# Patient Record
Sex: MALE | Race: WHITE
[De-identification: names, ages, dates, MRNs, and addresses within clinical notes are randomized per-mention and may not be internally consistent; named-entity substitution may affect disease eponyms.]

---

## 2018-11-23 ENCOUNTER — HOSPITAL ENCOUNTER (EMERGENCY)
Dept: HOSPITAL 94 - ER | Age: 48
Discharge: HOME | End: 2018-11-23
Payer: MEDICAID

## 2018-11-23 VITALS — HEIGHT: 67 IN | WEIGHT: 154.32 LBS | BODY MASS INDEX: 24.22 KG/M2

## 2018-11-23 VITALS — SYSTOLIC BLOOD PRESSURE: 131 MMHG | DIASTOLIC BLOOD PRESSURE: 78 MMHG

## 2018-11-23 DIAGNOSIS — I10: ICD-10-CM

## 2018-11-23 DIAGNOSIS — Z79.899: ICD-10-CM

## 2018-11-23 DIAGNOSIS — J45.909: ICD-10-CM

## 2018-11-23 DIAGNOSIS — G89.29: ICD-10-CM

## 2018-11-23 DIAGNOSIS — Z56.0: ICD-10-CM

## 2018-11-23 DIAGNOSIS — M25.511: Primary | ICD-10-CM

## 2018-11-23 PROCEDURE — 99283 EMERGENCY DEPT VISIT LOW MDM: CPT

## 2018-11-23 PROCEDURE — 96372 THER/PROPH/DIAG INJ SC/IM: CPT

## 2018-11-23 PROCEDURE — 73030 X-RAY EXAM OF SHOULDER: CPT

## 2018-11-23 SDOH — ECONOMIC STABILITY - INCOME SECURITY: UNEMPLOYMENT, UNSPECIFIED: Z56.0

## 2020-10-28 ENCOUNTER — HOSPITAL ENCOUNTER (INPATIENT)
Dept: HOSPITAL 94 - ER | Age: 50
LOS: 3 days | Discharge: HOME | DRG: 243 | End: 2020-10-31
Attending: FAMILY MEDICINE | Admitting: FAMILY MEDICINE
Payer: MEDICAID

## 2020-10-28 VITALS — BODY MASS INDEX: 28.99 KG/M2 | HEIGHT: 66 IN | WEIGHT: 180.38 LBS

## 2020-10-28 DIAGNOSIS — I73.9: ICD-10-CM

## 2020-10-28 DIAGNOSIS — J44.9: ICD-10-CM

## 2020-10-28 DIAGNOSIS — K44.9: ICD-10-CM

## 2020-10-28 DIAGNOSIS — F17.200: ICD-10-CM

## 2020-10-28 DIAGNOSIS — E87.6: ICD-10-CM

## 2020-10-28 DIAGNOSIS — K21.01: Primary | ICD-10-CM

## 2020-10-28 DIAGNOSIS — F32.9: ICD-10-CM

## 2020-10-28 DIAGNOSIS — K52.9: ICD-10-CM

## 2020-10-28 DIAGNOSIS — F41.9: ICD-10-CM

## 2020-10-28 DIAGNOSIS — G89.29: ICD-10-CM

## 2020-10-28 DIAGNOSIS — D50.9: ICD-10-CM

## 2020-10-28 DIAGNOSIS — Z87.11: ICD-10-CM

## 2020-10-28 DIAGNOSIS — E86.0: ICD-10-CM

## 2020-10-28 DIAGNOSIS — I10: ICD-10-CM

## 2020-10-28 DIAGNOSIS — F12.10: ICD-10-CM

## 2020-10-28 DIAGNOSIS — F20.9: ICD-10-CM

## 2020-10-28 LAB
ALBUMIN SERPL BCP-MCNC: 4.3 G/DL (ref 3.4–5)
ALBUMIN/GLOB SERPL: 1 {RATIO} (ref 1.1–1.5)
ALP SERPL-CCNC: 104 IU/L (ref 46–116)
ALT SERPL W P-5'-P-CCNC: 29 U/L (ref 12–78)
ANION GAP SERPL CALCULATED.3IONS-SCNC: 20 MMOL/L (ref 8–16)
AST SERPL W P-5'-P-CCNC: 40 U/L (ref 10–37)
BASOPHILS # BLD AUTO: 0.1 X10'3 (ref 0–0.2)
BASOPHILS NFR BLD AUTO: 0.3 % (ref 0–1)
BILIRUB SERPL-MCNC: 0.4 MG/DL (ref 0.1–1)
BUN SERPL-MCNC: 18 MG/DL (ref 7–18)
BUN/CREAT SERPL: 14.3 (ref 5.4–32)
CALCIUM SERPL-MCNC: 9 MG/DL (ref 8.5–10.1)
CHLORIDE SERPL-SCNC: 99 MMOL/L (ref 99–107)
CO2 SERPL-SCNC: 23 MMOL/L (ref 24–32)
CREAT SERPL-MCNC: 1.26 MG/DL (ref 0.6–1.1)
EOSINOPHIL # BLD AUTO: 0 X10'3 (ref 0–0.9)
EOSINOPHIL NFR BLD AUTO: 0 % (ref 0–6)
ERYTHROCYTE [DISTWIDTH] IN BLOOD BY AUTOMATED COUNT: 18.3 % (ref 11.5–14.5)
GASTROCULT GAST QL: POSITIVE
GFR SERPL CREATININE-BSD FRML MDRD: 60 ML/MIN
GLUCOSE SERPL-MCNC: 137 MG/DL (ref 70–104)
HBA1C MFR BLD: 6.2 % (ref 4.5–6.2)
HCT VFR BLD AUTO: 38.8 % (ref 42–52)
HGB BLD-MCNC: 12.4 G/DL (ref 14–17.9)
LIPASE SERPL-CCNC: < 50 U/L (ref 73–393)
LYMPHOCYTES # BLD AUTO: 1.6 X10'3 (ref 1.1–4.8)
LYMPHOCYTES NFR BLD AUTO: 9.2 % (ref 21–51)
MCH RBC QN AUTO: 25.1 PG (ref 27–31)
MCHC RBC AUTO-ENTMCNC: 32.1 G/DL (ref 33–36.5)
MCV RBC AUTO: 78.2 FL (ref 78–98)
MONOCYTES # BLD AUTO: 1 X10'3 (ref 0–0.9)
MONOCYTES NFR BLD AUTO: 5.6 % (ref 2–12)
NEUTROPHILS # BLD AUTO: 14.5 X10'3 (ref 1.8–7.7)
NEUTROPHILS NFR BLD AUTO: 84.9 % (ref 42–75)
PLATELET # BLD AUTO: 281 X10'3 (ref 140–440)
PMV BLD AUTO: 9.5 FL (ref 7.4–10.4)
POTASSIUM SERPL-SCNC: 3.3 MMOL/L (ref 3.5–5.1)
PROT SERPL-MCNC: 8.5 G/DL (ref 6.4–8.2)
RBC # BLD AUTO: 4.96 X10'6 (ref 4.7–6.1)
SODIUM SERPL-SCNC: 142 MMOL/L (ref 135–145)
WBC # BLD AUTO: 17.1 X10'3 (ref 4.5–11)

## 2020-10-28 PROCEDURE — 96375 TX/PRO/DX INJ NEW DRUG ADDON: CPT

## 2020-10-28 PROCEDURE — 74022 RADEX COMPL AQT ABD SERIES: CPT

## 2020-10-28 PROCEDURE — 87040 BLOOD CULTURE FOR BACTERIA: CPT

## 2020-10-28 PROCEDURE — 83735 ASSAY OF MAGNESIUM: CPT

## 2020-10-28 PROCEDURE — 94640 AIRWAY INHALATION TREATMENT: CPT

## 2020-10-28 PROCEDURE — 86901 BLOOD TYPING SEROLOGIC RH(D): CPT

## 2020-10-28 PROCEDURE — 96374 THER/PROPH/DIAG INJ IV PUSH: CPT

## 2020-10-28 PROCEDURE — 94760 N-INVAS EAR/PLS OXIMETRY 1: CPT

## 2020-10-28 PROCEDURE — 85025 COMPLETE CBC W/AUTO DIFF WBC: CPT

## 2020-10-28 PROCEDURE — 99285 EMERGENCY DEPT VISIT HI MDM: CPT

## 2020-10-28 PROCEDURE — 99153 MOD SED SAME PHYS/QHP EA: CPT

## 2020-10-28 PROCEDURE — BW211ZZ COMPUTERIZED TOMOGRAPHY (CT SCAN) OF ABDOMEN AND PELVIS USING LOW OSMOLAR CONTRAST: ICD-10-PCS

## 2020-10-28 PROCEDURE — 80053 COMPREHEN METABOLIC PANEL: CPT

## 2020-10-28 PROCEDURE — 80061 LIPID PANEL: CPT

## 2020-10-28 PROCEDURE — 84443 ASSAY THYROID STIM HORMONE: CPT

## 2020-10-28 PROCEDURE — 80305 DRUG TEST PRSMV DIR OPT OBS: CPT

## 2020-10-28 PROCEDURE — 74177 CT ABD & PELVIS W/CONTRAST: CPT

## 2020-10-28 PROCEDURE — 83605 ASSAY OF LACTIC ACID: CPT

## 2020-10-28 PROCEDURE — 81001 URINALYSIS AUTO W/SCOPE: CPT

## 2020-10-28 PROCEDURE — 84100 ASSAY OF PHOSPHORUS: CPT

## 2020-10-28 PROCEDURE — 74183 MRI ABD W/O CNTR FLWD CNTR: CPT

## 2020-10-28 PROCEDURE — 83540 ASSAY OF IRON: CPT

## 2020-10-28 PROCEDURE — 87081 CULTURE SCREEN ONLY: CPT

## 2020-10-28 PROCEDURE — 83036 HEMOGLOBIN GLYCOSYLATED A1C: CPT

## 2020-10-28 PROCEDURE — 82271 OCCULT BLOOD OTHER SOURCES: CPT

## 2020-10-28 PROCEDURE — 83550 IRON BINDING TEST: CPT

## 2020-10-28 PROCEDURE — 86900 BLOOD TYPING SEROLOGIC ABO: CPT

## 2020-10-28 PROCEDURE — 83690 ASSAY OF LIPASE: CPT

## 2020-10-28 PROCEDURE — 82728 ASSAY OF FERRITIN: CPT

## 2020-10-28 PROCEDURE — 36415 COLL VENOUS BLD VENIPUNCTURE: CPT

## 2020-10-28 PROCEDURE — 99152 MOD SED SAME PHYS/QHP 5/>YRS: CPT

## 2020-10-28 PROCEDURE — 43239 EGD BIOPSY SINGLE/MULTIPLE: CPT

## 2020-10-28 PROCEDURE — 86885 COOMBS TEST INDIRECT QUAL: CPT

## 2020-10-28 PROCEDURE — 74178 CT ABD&PLV WO CNTR FLWD CNTR: CPT

## 2020-10-28 PROCEDURE — 84145 PROCALCITONIN (PCT): CPT

## 2020-10-28 RX ADMIN — PANTOPRAZOLE SODIUM SCH MLS/HR: 40 INJECTION, POWDER, FOR SOLUTION INTRAVENOUS at 15:20

## 2020-10-28 RX ADMIN — BUDESONIDE SCH MG: 0.5 INHALANT RESPIRATORY (INHALATION) at 21:18

## 2020-10-28 RX ADMIN — PANTOPRAZOLE SODIUM SCH MLS/HR: 40 INJECTION, POWDER, FOR SOLUTION INTRAVENOUS at 20:39

## 2020-10-28 RX ADMIN — PANTOPRAZOLE SODIUM SCH MLS/HR: 40 INJECTION, POWDER, FOR SOLUTION INTRAVENOUS at 16:19

## 2020-10-28 NOTE — NUR
PT TAKES SEROQUEL 400 MG 2 TABS HS. WE DO NOT HAVE THIS IN OUR FORMULARY AND 
THE PT IS UNABLE TO BRING IT HERE. PHARMACIST, HUMA, CONSULTED AND RECOMMENDS 
SEROQUEL  MG BID. DR. MILLIGAN CALLED AND VERBAL RECEIVED FOR THIS NEW 
ORDER. PT PL[facility] BED. GIVEN MSIV 2 MG FOR ABD PAIN OF 7 OUT OF 10. 
PT WITH NO NAUSEA.

## 2020-10-28 NOTE — NUR
SPOKE WITH PT'S WIFE AND UPDATED HER ON HIS CONDITION. 



PT C/O STILL FEELING NAUSEOUS, DR. ROBERTSON WAS MADE AWARE.

## 2020-10-29 VITALS — SYSTOLIC BLOOD PRESSURE: 144 MMHG | DIASTOLIC BLOOD PRESSURE: 89 MMHG

## 2020-10-29 VITALS — SYSTOLIC BLOOD PRESSURE: 141 MMHG | DIASTOLIC BLOOD PRESSURE: 87 MMHG

## 2020-10-29 VITALS — SYSTOLIC BLOOD PRESSURE: 150 MMHG | DIASTOLIC BLOOD PRESSURE: 98 MMHG

## 2020-10-29 VITALS — SYSTOLIC BLOOD PRESSURE: 131 MMHG | DIASTOLIC BLOOD PRESSURE: 76 MMHG

## 2020-10-29 VITALS — SYSTOLIC BLOOD PRESSURE: 142 MMHG | DIASTOLIC BLOOD PRESSURE: 86 MMHG

## 2020-10-29 VITALS — DIASTOLIC BLOOD PRESSURE: 88 MMHG | SYSTOLIC BLOOD PRESSURE: 145 MMHG

## 2020-10-29 VITALS — DIASTOLIC BLOOD PRESSURE: 88 MMHG | SYSTOLIC BLOOD PRESSURE: 144 MMHG

## 2020-10-29 VITALS — SYSTOLIC BLOOD PRESSURE: 142 MMHG | DIASTOLIC BLOOD PRESSURE: 89 MMHG

## 2020-10-29 VITALS — SYSTOLIC BLOOD PRESSURE: 138 MMHG | DIASTOLIC BLOOD PRESSURE: 98 MMHG

## 2020-10-29 VITALS — SYSTOLIC BLOOD PRESSURE: 134 MMHG | DIASTOLIC BLOOD PRESSURE: 94 MMHG

## 2020-10-29 LAB
ALBUMIN SERPL BCP-MCNC: 3.2 G/DL (ref 3.4–5)
ALBUMIN/GLOB SERPL: 0.9 {RATIO} (ref 1.1–1.5)
ALP SERPL-CCNC: 76 IU/L (ref 46–116)
ALT SERPL W P-5'-P-CCNC: 22 U/L (ref 12–78)
AMPHETAMINES UR QL SCN: NEGATIVE
ANION GAP SERPL CALCULATED.3IONS-SCNC: 6 MMOL/L (ref 8–16)
AST SERPL W P-5'-P-CCNC: 25 U/L (ref 10–37)
BACTERIA URNS QL MICRO: (no result) /HPF
BARBITURATES UR QL SCN: NEGATIVE
BASOPHILS # BLD AUTO: 0 X10'3 (ref 0–0.2)
BASOPHILS NFR BLD AUTO: 0.3 % (ref 0–1)
BENZODIAZ UR QL SCN: POSITIVE
BILIRUB SERPL-MCNC: 0.4 MG/DL (ref 0.1–1)
BUN SERPL-MCNC: 11 MG/DL (ref 7–18)
BUN/CREAT SERPL: 13.9 (ref 5.4–32)
BZE UR QL SCN: NEGATIVE
CALCIUM SERPL-MCNC: 8.1 MG/DL (ref 8.5–10.1)
CANNABINOIDS UR QL SCN: POSITIVE
CHLORIDE SERPL-SCNC: 105 MMOL/L (ref 99–107)
CHOLEST SERPL-MCNC: 193 MG/DL (ref 0–200)
CHOLEST/HDLC SERPL: 3.4 {RATIO} (ref 0–4.99)
CLARITY UR: CLEAR
CO2 SERPL-SCNC: 27.3 MMOL/L (ref 24–32)
COLOR UR: YELLOW
CREAT SERPL-MCNC: 0.79 MG/DL (ref 0.6–1.1)
DEPRECATED SQUAMOUS URNS QL MICRO: (no result) /LPF
EOSINOPHIL # BLD AUTO: 0 X10'3 (ref 0–0.9)
EOSINOPHIL NFR BLD AUTO: 0.1 % (ref 0–6)
ERYTHROCYTE [DISTWIDTH] IN BLOOD BY AUTOMATED COUNT: 17.9 % (ref 11.5–14.5)
FERRITIN SERPL-MCNC: 19 NG/ML (ref 26–388)
GFR SERPL CREATININE-BSD FRML MDRD: > 90 ML/MIN
GLUCOSE SERPL-MCNC: 131 MG/DL (ref 70–104)
GLUCOSE UR STRIP-MCNC: NEGATIVE MG/DL
HCT VFR BLD AUTO: 32.3 % (ref 42–52)
HDLC SERPL-MCNC: 57 MG/DL (ref 35–60)
HGB BLD-MCNC: 10.5 G/DL (ref 14–17.9)
HGB UR QL STRIP: NEGATIVE
IRON SATN MFR SERPL: 29 % (ref 11–46)
IRON SATN MFR SERPL: 347 UG/DL (ref 259–388)
IRON SERPL-MCNC: 101 UG/DL (ref 53–167)
KETONES UR STRIP-MCNC: 40 MG/DL
LDLC SERPL DIRECT ASSAY-MCNC: 118 MG/DL (ref 50–100)
LEUKOCYTE ESTERASE UR QL STRIP: NEGATIVE
LYMPHOCYTES # BLD AUTO: 1.8 X10'3 (ref 1.1–4.8)
LYMPHOCYTES NFR BLD AUTO: 20.7 % (ref 21–51)
MAGNESIUM SERPL-MCNC: 1.9 MG/DL (ref 1.5–2.4)
MCH RBC QN AUTO: 25.4 PG (ref 27–31)
MCHC RBC AUTO-ENTMCNC: 32.7 G/DL (ref 33–36.5)
MCV RBC AUTO: 77.7 FL (ref 78–98)
METHADONE UR QL SCN: NEGATIVE
MONOCYTES # BLD AUTO: 1.2 X10'3 (ref 0–0.9)
MONOCYTES NFR BLD AUTO: 13.7 % (ref 2–12)
NEUTROPHILS # BLD AUTO: 5.7 X10'3 (ref 1.8–7.7)
NEUTROPHILS NFR BLD AUTO: 65.2 % (ref 42–75)
NITRITE UR QL STRIP: NEGATIVE
OPIATES UR QL SCN: POSITIVE
PCP UR QL SCN: NEGATIVE
PH UR STRIP: 7.5 [PH] (ref 4.8–8)
PHOSPHATE SERPL-MCNC: 3 MG/DL (ref 2.3–4.5)
PLATELET # BLD AUTO: 194 X10'3 (ref 140–440)
PMV BLD AUTO: 9.3 FL (ref 7.4–10.4)
POTASSIUM SERPL-SCNC: 2.9 MMOL/L (ref 3.5–5.1)
PROT SERPL-MCNC: 6.6 G/DL (ref 6.4–8.2)
PROT UR QL STRIP: (no result) MG/DL
RBC # BLD AUTO: 4.15 X10'6 (ref 4.7–6.1)
RBC #/AREA URNS HPF: (no result) /HPF (ref 0–2)
SODIUM SERPL-SCNC: 138 MMOL/L (ref 135–145)
SP GR UR STRIP: 1.01 (ref 1–1.03)
TRIGL SERPL-MCNC: 71 MG/DL (ref 20–135)
URN COLLECT METHOD CLASS: (no result)
UROBILINOGEN UR STRIP-MCNC: 0.2 E.U/DL (ref 0.2–1)
WBC # BLD AUTO: 8.8 X10'3 (ref 4.5–11)
WBC #/AREA URNS HPF: (no result) /HPF (ref 0–4)

## 2020-10-29 PROCEDURE — 0DB58ZX EXCISION OF ESOPHAGUS, VIA NATURAL OR ARTIFICIAL OPENING ENDOSCOPIC, DIAGNOSTIC: ICD-10-PCS | Performed by: SPECIALIST

## 2020-10-29 RX ADMIN — POTASSIUM CHLORIDE PRN MLS/HR: 7.46 INJECTION, SOLUTION INTRAVENOUS at 16:37

## 2020-10-29 RX ADMIN — POTASSIUM CHLORIDE PRN MLS/HR: 7.46 INJECTION, SOLUTION INTRAVENOUS at 22:51

## 2020-10-29 RX ADMIN — POTASSIUM CHLORIDE PRN MLS/HR: 7.46 INJECTION, SOLUTION INTRAVENOUS at 17:57

## 2020-10-29 RX ADMIN — POTASSIUM CHLORIDE PRN MLS/HR: 7.46 INJECTION, SOLUTION INTRAVENOUS at 14:11

## 2020-10-29 RX ADMIN — BUDESONIDE SCH MG: 0.5 INHALANT RESPIRATORY (INHALATION) at 19:52

## 2020-10-29 RX ADMIN — DIATRIZOATE MEGLUMINE AND DIATRIZOATE SODIUM SCH ML: 660; 100 LIQUID ORAL; RECTAL at 21:35

## 2020-10-29 RX ADMIN — LEVOFLOXACIN SCH MLS/HR: 750 INJECTION, SOLUTION INTRAVENOUS at 14:11

## 2020-10-29 RX ADMIN — PANTOPRAZOLE SODIUM SCH MLS/HR: 40 INJECTION, POWDER, FOR SOLUTION INTRAVENOUS at 17:43

## 2020-10-29 RX ADMIN — HYDROCODONE BITARTRATE AND ACETAMINOPHEN PRN TAB: 10; 325 TABLET ORAL at 04:55

## 2020-10-29 RX ADMIN — PANTOPRAZOLE SODIUM SCH MLS/HR: 40 INJECTION, POWDER, FOR SOLUTION INTRAVENOUS at 10:22

## 2020-10-29 RX ADMIN — PANTOPRAZOLE SODIUM SCH MLS/HR: 40 INJECTION, POWDER, FOR SOLUTION INTRAVENOUS at 06:15

## 2020-10-29 RX ADMIN — POTASSIUM CHLORIDE PRN MLS/HR: 7.46 INJECTION, SOLUTION INTRAVENOUS at 08:49

## 2020-10-29 RX ADMIN — METRONIDAZOLE SCH MLS/HR: 500 SOLUTION INTRAVENOUS at 17:43

## 2020-10-29 RX ADMIN — ONDANSETRON PRN MG: 2 INJECTION, SOLUTION INTRAMUSCULAR; INTRAVENOUS at 17:23

## 2020-10-29 RX ADMIN — METRONIDAZOLE SCH MLS/HR: 500 SOLUTION INTRAVENOUS at 09:54

## 2020-10-29 RX ADMIN — POTASSIUM CHLORIDE PRN MLS/HR: 7.46 INJECTION, SOLUTION INTRAVENOUS at 19:04

## 2020-10-29 RX ADMIN — POTASSIUM CHLORIDE PRN MLS/HR: 7.46 INJECTION, SOLUTION INTRAVENOUS at 10:22

## 2020-10-29 RX ADMIN — POTASSIUM CHLORIDE PRN MLS/HR: 7.46 INJECTION, SOLUTION INTRAVENOUS at 21:39

## 2020-10-29 RX ADMIN — HYDROCODONE BITARTRATE AND ACETAMINOPHEN PRN TAB: 10; 325 TABLET ORAL at 17:43

## 2020-10-29 RX ADMIN — BUDESONIDE SCH MG: 0.5 INHALANT RESPIRATORY (INHALATION) at 08:56

## 2020-10-29 RX ADMIN — PANTOPRAZOLE SODIUM SCH MLS/HR: 40 INJECTION, POWDER, FOR SOLUTION INTRAVENOUS at 01:56

## 2020-10-29 RX ADMIN — SODIUM FERRIC GLUCONATE COMPLEX SCH MLS/HR: 12.5 INJECTION INTRAVENOUS at 14:53

## 2020-10-29 RX ADMIN — ONDANSETRON PRN MG: 2 INJECTION, SOLUTION INTRAMUSCULAR; INTRAVENOUS at 01:50

## 2020-10-29 NOTE — NUR
PT VOIDED 400 CC'S LIGHT SALLIE URINE IN URINAL. SAMPLE SENT FOR UA.  PT WITHI 
NO NEEDS AT THIS TIME. REPORTS MINIMAL PAIN AND NO NAUSEA. AWAITING IPA.

## 2020-10-29 NOTE — NUR
Problems reprioritized. Patient report given, questions answered & plan of care reviewed 
with aline alamo.

## 2020-10-29 NOTE — NUR
PAGED DR EUBANKS RE:

PAGER ID:  [phone] 

MESSAGE:  SERENA DUBOSE. JENNIFER 2.9. WILL REPLACE PER PROTOCOL. SURGICAL [facility] [id number]

## 2020-10-29 NOTE — NUR
Patient in room ED 13. I have received report from nancy alamo   and had the opportunity to 
ask questions and assume patient care.

## 2020-10-29 NOTE — NUR
I have reviewed and agree with all medications administered and interventions performed by 
P Student Lauryn Bell.

## 2020-10-29 NOTE — NUR
pt awake and up to br to void. vss. pain increasing to 7-8 and some mild 
nausea. given msiv 2 mg and zofran

## 2020-10-30 VITALS — SYSTOLIC BLOOD PRESSURE: 123 MMHG | DIASTOLIC BLOOD PRESSURE: 76 MMHG

## 2020-10-30 VITALS — DIASTOLIC BLOOD PRESSURE: 64 MMHG | SYSTOLIC BLOOD PRESSURE: 118 MMHG

## 2020-10-30 VITALS — SYSTOLIC BLOOD PRESSURE: 144 MMHG | DIASTOLIC BLOOD PRESSURE: 90 MMHG

## 2020-10-30 VITALS — DIASTOLIC BLOOD PRESSURE: 93 MMHG | SYSTOLIC BLOOD PRESSURE: 147 MMHG

## 2020-10-30 LAB
ALBUMIN SERPL BCP-MCNC: 2.9 G/DL (ref 3.4–5)
ALBUMIN/GLOB SERPL: 1 {RATIO} (ref 1.1–1.5)
ALP SERPL-CCNC: 72 IU/L (ref 46–116)
ALT SERPL W P-5'-P-CCNC: 19 U/L (ref 12–78)
ANION GAP SERPL CALCULATED.3IONS-SCNC: 6 MMOL/L (ref 8–16)
AST SERPL W P-5'-P-CCNC: 20 U/L (ref 10–37)
BASOPHILS # BLD AUTO: 0 X10'3 (ref 0–0.2)
BASOPHILS NFR BLD AUTO: 0.5 % (ref 0–1)
BILIRUB SERPL-MCNC: 0.4 MG/DL (ref 0.1–1)
BUN SERPL-MCNC: 7 MG/DL (ref 7–18)
BUN/CREAT SERPL: 8.8 (ref 5.4–32)
CALCIUM SERPL-MCNC: 7.8 MG/DL (ref 8.5–10.1)
CHLORIDE SERPL-SCNC: 107 MMOL/L (ref 99–107)
CO2 SERPL-SCNC: 27.5 MMOL/L (ref 24–32)
CREAT SERPL-MCNC: 0.8 MG/DL (ref 0.6–1.1)
EOSINOPHIL # BLD AUTO: 0 X10'3 (ref 0–0.9)
EOSINOPHIL NFR BLD AUTO: 0.4 % (ref 0–6)
ERYTHROCYTE [DISTWIDTH] IN BLOOD BY AUTOMATED COUNT: 17.8 % (ref 11.5–14.5)
GFR SERPL CREATININE-BSD FRML MDRD: > 90 ML/MIN
GLUCOSE SERPL-MCNC: 110 MG/DL (ref 70–104)
HCT VFR BLD AUTO: 30.2 % (ref 42–52)
HGB BLD-MCNC: 9.9 G/DL (ref 14–17.9)
LYMPHOCYTES # BLD AUTO: 2.2 X10'3 (ref 1.1–4.8)
LYMPHOCYTES NFR BLD AUTO: 28.5 % (ref 21–51)
MAGNESIUM SERPL-MCNC: 1.5 MG/DL (ref 1.5–2.4)
MCH RBC QN AUTO: 26 PG (ref 27–31)
MCHC RBC AUTO-ENTMCNC: 32.9 G/DL (ref 33–36.5)
MCV RBC AUTO: 79 FL (ref 78–98)
MONOCYTES # BLD AUTO: 1.1 X10'3 (ref 0–0.9)
MONOCYTES NFR BLD AUTO: 13.7 % (ref 2–12)
NEUTROPHILS # BLD AUTO: 4.5 X10'3 (ref 1.8–7.7)
NEUTROPHILS NFR BLD AUTO: 56.9 % (ref 42–75)
PHOSPHATE SERPL-MCNC: 2.3 MG/DL (ref 2.3–4.5)
PLATELET # BLD AUTO: 194 X10'3 (ref 140–440)
PMV BLD AUTO: 9.6 FL (ref 7.4–10.4)
POTASSIUM SERPL-SCNC: 3.5 MMOL/L (ref 3.5–5.1)
PROT SERPL-MCNC: 5.9 G/DL (ref 6.4–8.2)
RBC # BLD AUTO: 3.82 X10'6 (ref 4.7–6.1)
SODIUM SERPL-SCNC: 140 MMOL/L (ref 135–145)
WBC # BLD AUTO: 7.9 X10'3 (ref 4.5–11)

## 2020-10-30 PROCEDURE — BW211ZZ COMPUTERIZED TOMOGRAPHY (CT SCAN) OF ABDOMEN AND PELVIS USING LOW OSMOLAR CONTRAST: ICD-10-PCS

## 2020-10-30 RX ADMIN — METRONIDAZOLE SCH MLS/HR: 500 SOLUTION INTRAVENOUS at 23:52

## 2020-10-30 RX ADMIN — PANTOPRAZOLE SODIUM SCH MLS/HR: 40 INJECTION, POWDER, FOR SOLUTION INTRAVENOUS at 00:16

## 2020-10-30 RX ADMIN — PANTOPRAZOLE SODIUM SCH MG: 40 TABLET, DELAYED RELEASE ORAL at 19:15

## 2020-10-30 RX ADMIN — SODIUM FERRIC GLUCONATE COMPLEX SCH MLS/HR: 12.5 INJECTION INTRAVENOUS at 09:27

## 2020-10-30 RX ADMIN — BUDESONIDE SCH MG: 0.5 INHALANT RESPIRATORY (INHALATION) at 07:15

## 2020-10-30 RX ADMIN — PANTOPRAZOLE SODIUM SCH MLS/HR: 40 INJECTION, POWDER, FOR SOLUTION INTRAVENOUS at 01:00

## 2020-10-30 RX ADMIN — DIATRIZOATE MEGLUMINE AND DIATRIZOATE SODIUM SCH ML: 660; 100 LIQUID ORAL; RECTAL at 07:21

## 2020-10-30 RX ADMIN — BUDESONIDE SCH MG: 0.5 INHALANT RESPIRATORY (INHALATION) at 20:35

## 2020-10-30 RX ADMIN — HYDROCODONE BITARTRATE AND ACETAMINOPHEN PRN TAB: 10; 325 TABLET ORAL at 00:37

## 2020-10-30 RX ADMIN — METRONIDAZOLE SCH MLS/HR: 500 SOLUTION INTRAVENOUS at 07:21

## 2020-10-30 RX ADMIN — PANTOPRAZOLE SODIUM SCH MLS/HR: 40 INJECTION, POWDER, FOR SOLUTION INTRAVENOUS at 05:28

## 2020-10-30 RX ADMIN — HYDROCODONE BITARTRATE AND ACETAMINOPHEN PRN TAB: 10; 325 TABLET ORAL at 05:28

## 2020-10-30 RX ADMIN — METRONIDAZOLE SCH MLS/HR: 500 SOLUTION INTRAVENOUS at 00:17

## 2020-10-30 RX ADMIN — METRONIDAZOLE SCH MLS/HR: 500 SOLUTION INTRAVENOUS at 17:34

## 2020-10-30 RX ADMIN — DIATRIZOATE MEGLUMINE AND DIATRIZOATE SODIUM SCH ML: 660; 100 LIQUID ORAL; RECTAL at 10:32

## 2020-10-30 RX ADMIN — LEVOFLOXACIN SCH MLS/HR: 750 INJECTION, SOLUTION INTRAVENOUS at 11:24

## 2020-10-30 NOTE — NUR
Patient in room [facility]. I have received report from MATT HUERTA and had the opportunity to 
ask questions and assume patient care.

## 2020-10-30 NOTE — NUR
Patient in room [facility]. I have received report from  DEANNA Payton  and had the opportunity to 
ask questions and assume patient care.

## 2020-10-31 VITALS — SYSTOLIC BLOOD PRESSURE: 127 MMHG | DIASTOLIC BLOOD PRESSURE: 86 MMHG

## 2020-10-31 VITALS — SYSTOLIC BLOOD PRESSURE: 151 MMHG | DIASTOLIC BLOOD PRESSURE: 90 MMHG

## 2020-10-31 VITALS — DIASTOLIC BLOOD PRESSURE: 82 MMHG | SYSTOLIC BLOOD PRESSURE: 129 MMHG

## 2020-10-31 LAB
ALBUMIN SERPL BCP-MCNC: 2.9 G/DL (ref 3.4–5)
ALBUMIN/GLOB SERPL: 0.9 {RATIO} (ref 1.1–1.5)
ALP SERPL-CCNC: 66 IU/L (ref 46–116)
ALT SERPL W P-5'-P-CCNC: 20 U/L (ref 12–78)
ANION GAP SERPL CALCULATED.3IONS-SCNC: 10 MMOL/L (ref 8–16)
AST SERPL W P-5'-P-CCNC: 22 U/L (ref 10–37)
BASOPHILS # BLD AUTO: 0 X10'3 (ref 0–0.2)
BASOPHILS NFR BLD AUTO: 0.6 % (ref 0–1)
BILIRUB SERPL-MCNC: 0.3 MG/DL (ref 0.1–1)
BUN SERPL-MCNC: 5 MG/DL (ref 7–18)
BUN/CREAT SERPL: 5.9 (ref 5.4–32)
CALCIUM SERPL-MCNC: 8.2 MG/DL (ref 8.5–10.1)
CHLORIDE SERPL-SCNC: 107 MMOL/L (ref 99–107)
CO2 SERPL-SCNC: 26.1 MMOL/L (ref 24–32)
CREAT SERPL-MCNC: 0.85 MG/DL (ref 0.6–1.1)
EOSINOPHIL # BLD AUTO: 0.1 X10'3 (ref 0–0.9)
EOSINOPHIL NFR BLD AUTO: 1.8 % (ref 0–6)
ERYTHROCYTE [DISTWIDTH] IN BLOOD BY AUTOMATED COUNT: 18.7 % (ref 11.5–14.5)
GFR SERPL CREATININE-BSD FRML MDRD: > 90 ML/MIN
GLUCOSE SERPL-MCNC: 122 MG/DL (ref 70–104)
HCT VFR BLD AUTO: 32.6 % (ref 42–52)
HGB BLD-MCNC: 10.5 G/DL (ref 14–17.9)
LYMPHOCYTES # BLD AUTO: 1.8 X10'3 (ref 1.1–4.8)
LYMPHOCYTES NFR BLD AUTO: 33.1 % (ref 21–51)
MAGNESIUM SERPL-MCNC: 1.7 MG/DL (ref 1.5–2.4)
MCH RBC QN AUTO: 25.3 PG (ref 27–31)
MCHC RBC AUTO-ENTMCNC: 32.1 G/DL (ref 33–36.5)
MCV RBC AUTO: 79 FL (ref 78–98)
MONOCYTES # BLD AUTO: 0.8 X10'3 (ref 0–0.9)
MONOCYTES NFR BLD AUTO: 14.8 % (ref 2–12)
NEUTROPHILS # BLD AUTO: 2.7 X10'3 (ref 1.8–7.7)
NEUTROPHILS NFR BLD AUTO: 49.7 % (ref 42–75)
PHOSPHATE SERPL-MCNC: 3.4 MG/DL (ref 2.3–4.5)
PLATELET # BLD AUTO: 186 X10'3 (ref 140–440)
PMV BLD AUTO: 9.7 FL (ref 7.4–10.4)
POTASSIUM SERPL-SCNC: 3.2 MMOL/L (ref 3.5–5.1)
PROT SERPL-MCNC: 6.1 G/DL (ref 6.4–8.2)
RBC # BLD AUTO: 4.12 X10'6 (ref 4.7–6.1)
SODIUM SERPL-SCNC: 143 MMOL/L (ref 135–145)
WBC # BLD AUTO: 5.4 X10'3 (ref 4.5–11)

## 2020-10-31 RX ADMIN — SODIUM FERRIC GLUCONATE COMPLEX SCH MLS/HR: 12.5 INJECTION INTRAVENOUS at 08:52

## 2020-10-31 RX ADMIN — PANTOPRAZOLE SODIUM SCH MG: 40 TABLET, DELAYED RELEASE ORAL at 07:46

## 2020-10-31 RX ADMIN — BUDESONIDE SCH MG: 0.5 INHALANT RESPIRATORY (INHALATION) at 07:18

## 2020-10-31 RX ADMIN — METRONIDAZOLE SCH MLS/HR: 500 SOLUTION INTRAVENOUS at 07:46

## 2020-10-31 RX ADMIN — LEVOFLOXACIN SCH MLS/HR: 750 INJECTION, SOLUTION INTRAVENOUS at 10:49

## 2020-10-31 NOTE — NUR
Problems reprioritized. Patient report given, questions answered & plan of care reviewed 
with MATT HUERTA.

## 2020-10-31 NOTE — NUR
Pt discharged per NSG. Discharge instructions and medications reviewed. New prescriptions 
sent to [facility] on [address]. Pt instructed to follow up with PCP and Dr Kyle, as well 
as to abstain from drinking alcohol, smoking tobacco, and smoking marijuana. Pt stated 
understanding and willingness to comply. IV DC'd, cannula intact. Pt states ride is en route 
and will let us know when they have arrived.

## 2020-10-31 NOTE — NUR
Patient in room [facility]. I have received report from  DEANNA Wilkinson  and had the opportunity 
to ask questions and assume patient care.

## 2023-04-16 NOTE — NUR
Pt discharged to home via personal vehicle. Escorted to front lobby via wheelchair by PCT, 
all personal belongings sent home with patient. no